# Patient Record
Sex: MALE | Race: WHITE
[De-identification: names, ages, dates, MRNs, and addresses within clinical notes are randomized per-mention and may not be internally consistent; named-entity substitution may affect disease eponyms.]

---

## 2018-06-16 ENCOUNTER — HOSPITAL ENCOUNTER (EMERGENCY)
Dept: HOSPITAL 17 - NEPE | Age: 33
Discharge: HOME | End: 2018-06-16
Payer: SELF-PAY

## 2018-06-16 VITALS
RESPIRATION RATE: 18 BRPM | OXYGEN SATURATION: 99 % | TEMPERATURE: 98 F | DIASTOLIC BLOOD PRESSURE: 82 MMHG | HEART RATE: 109 BPM | SYSTOLIC BLOOD PRESSURE: 146 MMHG

## 2018-06-16 VITALS — HEIGHT: 67 IN | BODY MASS INDEX: 22.15 KG/M2 | WEIGHT: 141.1 LBS

## 2018-06-16 DIAGNOSIS — R00.2: ICD-10-CM

## 2018-06-16 DIAGNOSIS — I49.9: ICD-10-CM

## 2018-06-16 DIAGNOSIS — R07.89: Primary | ICD-10-CM

## 2018-06-16 DIAGNOSIS — F14.90: ICD-10-CM

## 2018-06-16 DIAGNOSIS — Z72.0: ICD-10-CM

## 2018-06-16 LAB
ALBUMIN SERPL-MCNC: 4.8 GM/DL (ref 3.4–5)
ALP SERPL-CCNC: 89 U/L (ref 45–117)
ALT SERPL-CCNC: 25 U/L (ref 12–78)
AST SERPL-CCNC: 18 U/L (ref 15–37)
BASOPHILS # BLD AUTO: 0.1 TH/MM3 (ref 0–0.2)
BASOPHILS NFR BLD: 0.7 % (ref 0–2)
BILIRUB SERPL-MCNC: 0.8 MG/DL (ref 0.2–1)
BUN SERPL-MCNC: 11 MG/DL (ref 7–18)
CALCIUM SERPL-MCNC: 9.7 MG/DL (ref 8.5–10.1)
CHLORIDE SERPL-SCNC: 106 MEQ/L (ref 98–107)
CREAT SERPL-MCNC: 1.23 MG/DL (ref 0.6–1.3)
EOSINOPHIL # BLD: 0 TH/MM3 (ref 0–0.4)
EOSINOPHIL NFR BLD: 0.1 % (ref 0–4)
ERYTHROCYTE [DISTWIDTH] IN BLOOD BY AUTOMATED COUNT: 12.5 % (ref 11.6–17.2)
GFR SERPLBLD BASED ON 1.73 SQ M-ARVRAT: 68 ML/MIN (ref 89–?)
GLUCOSE SERPL-MCNC: 105 MG/DL (ref 74–106)
HCO3 BLD-SCNC: 19.4 MEQ/L (ref 21–32)
HCT VFR BLD CALC: 43.5 % (ref 39–51)
HGB BLD-MCNC: 15.2 GM/DL (ref 13–17)
INR PPP: 1.1 RATIO
LYMPHOCYTES # BLD AUTO: 2.9 TH/MM3 (ref 1–4.8)
LYMPHOCYTES NFR BLD AUTO: 22 % (ref 9–44)
MAGNESIUM SERPL-MCNC: 2 MG/DL (ref 1.5–2.5)
MCH RBC QN AUTO: 30.9 PG (ref 27–34)
MCHC RBC AUTO-ENTMCNC: 34.9 % (ref 32–36)
MCV RBC AUTO: 88.6 FL (ref 80–100)
MONOCYTE #: 1.3 TH/MM3 (ref 0–0.9)
MONOCYTES NFR BLD: 9.4 % (ref 0–8)
NEUTROPHILS # BLD AUTO: 9.1 TH/MM3 (ref 1.8–7.7)
NEUTROPHILS NFR BLD AUTO: 67.8 % (ref 16–70)
PLATELET # BLD: 299 TH/MM3 (ref 150–450)
PMV BLD AUTO: 8.8 FL (ref 7–11)
PROT SERPL-MCNC: 8.6 GM/DL (ref 6.4–8.2)
PROTHROMBIN TIME: 10.8 SEC (ref 9.8–11.6)
RBC # BLD AUTO: 4.91 MIL/MM3 (ref 4.5–5.9)
SODIUM SERPL-SCNC: 140 MEQ/L (ref 136–145)
TROPONIN I SERPL-MCNC: (no result) NG/ML (ref 0.02–0.05)
WBC # BLD AUTO: 13.4 TH/MM3 (ref 4–11)

## 2018-06-16 PROCEDURE — 71045 X-RAY EXAM CHEST 1 VIEW: CPT

## 2018-06-16 PROCEDURE — 83735 ASSAY OF MAGNESIUM: CPT

## 2018-06-16 PROCEDURE — 85610 PROTHROMBIN TIME: CPT

## 2018-06-16 PROCEDURE — 93005 ELECTROCARDIOGRAM TRACING: CPT

## 2018-06-16 PROCEDURE — 82552 ASSAY OF CPK IN BLOOD: CPT

## 2018-06-16 PROCEDURE — 85730 THROMBOPLASTIN TIME PARTIAL: CPT

## 2018-06-16 PROCEDURE — 82550 ASSAY OF CK (CPK): CPT

## 2018-06-16 PROCEDURE — 85025 COMPLETE CBC W/AUTO DIFF WBC: CPT

## 2018-06-16 PROCEDURE — 99284 EMERGENCY DEPT VISIT MOD MDM: CPT

## 2018-06-16 PROCEDURE — 80053 COMPREHEN METABOLIC PANEL: CPT

## 2018-06-16 PROCEDURE — 84484 ASSAY OF TROPONIN QUANT: CPT

## 2018-06-16 NOTE — PD
HPI


Chief Complaint:  Chest Pain


Time Seen by Provider:  03:15


Travel History


International Travel<30 days:  No


Contact w/Intl Traveler<30days:  No


Traveled to known affect area:  No





History of Present Illness


HPI


31-year-old male presents emergency department with chest discomfort the left 

side of his chest as well as palpitations.  Patient states that he did use 

cocaine several hours prior to arrival.  No shortness of breath no abdominal 

pain no nausea vomiting diarrhea constipation head injury neck injury back 

injury.  He is accompanied by his mother.  No early history of heart disease, 

the patient is a smoker.  No history of high blood pressure high cholesterol.  

States symptoms have resolved now.  Left side of the chest, no radiation, 

associated signs symptoms in context as above.





PFSH


Past Medical History


Medical History:  Denies Significant Hx


Diminished Hearing:  No





Past Surgical History


Surgical History:  No Previous Surgery





Social History


Alcohol Use:  Yes (FREQUENT )


Tobacco Use:  Yes (EVERYDAY )


Substance Use:  Yes (COCAINE, LAST USED 6/15/18)





Allergies-Medications


(Allergen,Severity, Reaction):  


Coded Allergies:  


     No Known Allergies (Unverified , 6/16/18)


Reported Meds & Prescriptions





Reported Meds & Active Scripts


Active


No Active Prescriptions or Reported Medications    








Review of Systems


Except as stated in HPI:  all other systems reviewed are Neg





Physical Exam


Narrative


GENERAL: Well-developed well-nourished pleasant male in no obvious distress peer


SKIN: Focused skin assessment warm/dry.


HEAD: Atraumatic. Normocephalic. 


EYES: Pupils equal and round. No scleral icterus. No injection or drainage. 


ENT: No nasal bleeding or discharge.  Mucous membranes pink and moist.


NECK: Trachea midline. No JVD. 


CARDIOVASCULAR: Regular rate and rhythm.  No murmur appreciated.  2+ bilateral 

pulses in all 4 extremity's, no murmurs gallops or rubs.


RESPIRATORY: No accessory muscle use. Clear to auscultation. Breath sounds 

equal bilaterally. 


GASTROINTESTINAL: Abdomen soft, non-tender, nondistended. Hepatic and splenic 

margins not palpable. 


MUSCULOSKELETAL: No obvious deformities. No clubbing.  No cyanosis.  No edema. 


NEUROLOGICAL: Awake and alert. No obvious cranial nerve deficits.  Motor 

grossly within normal limits. Normal speech.


PSYCHIATRIC: Appropriate mood and affect; insight and judgment normal.





Data


Data


Last Documented VS





Vital Signs








  Date Time  Temp Pulse Resp B/P (MAP) Pulse Ox O2 Delivery O2 Flow Rate FiO2


 


6/16/18 04:35        


 


6/16/18 03:01 98.0 109 18  99   








Orders





 Orders


Ckmb (Isoenzyme) Profile (6/16/18 03:38)


Complete Blood Count With Diff (6/16/18 03:38)


Comprehensive Metabolic Panel (6/16/18 03:38)


Magnesium (Mg) (6/16/18 03:38)


Prothrombin Time / Inr (Pt) (6/16/18 03:38)


Act Partial Throm Time (Ptt) (6/16/18 03:38)


Troponin I (6/16/18 03:38)


Chest, Single Ap (6/16/18 03:38)


Ecg Monitoring (6/16/18 03:38)


Bilateral Bp Monitoring (6/16/18 03:38)


Iv Access Insert/Monitor (6/16/18 03:38)


Oximetry (6/16/18 03:38)


Oxygen Administration (6/16/18 03:38)


Sodium Chloride 0.9% Flush (Ns Flush) (6/16/18 03:45)


CKMB (6/16/18 03:42)


CKMB% (6/16/18 03:42)


Ed Discharge Order (6/16/18 04:32)


Electrocardiogram (6/16/18 02:10)





Labs





Laboratory Tests








Test


  6/16/18


03:42


 


White Blood Count 13.4 TH/MM3 


 


Red Blood Count 4.91 MIL/MM3 


 


Hemoglobin 15.2 GM/DL 


 


Hematocrit 43.5 % 


 


Mean Corpuscular Volume 88.6 FL 


 


Mean Corpuscular Hemoglobin 30.9 PG 


 


Mean Corpuscular Hemoglobin


Concent 34.9 % 


 


 


Red Cell Distribution Width 12.5 % 


 


Platelet Count 299 TH/MM3 


 


Mean Platelet Volume 8.8 FL 


 


Neutrophils (%) (Auto) 67.8 % 


 


Lymphocytes (%) (Auto) 22.0 % 


 


Monocytes (%) (Auto) 9.4 % 


 


Eosinophils (%) (Auto) 0.1 % 


 


Basophils (%) (Auto) 0.7 % 


 


Neutrophils # (Auto) 9.1 TH/MM3 


 


Lymphocytes # (Auto) 2.9 TH/MM3 


 


Monocytes # (Auto) 1.3 TH/MM3 


 


Eosinophils # (Auto) 0.0 TH/MM3 


 


Basophils # (Auto) 0.1 TH/MM3 


 


CBC Comment DIFF FINAL 


 


Differential Comment  


 


Prothrombin Time 10.8 SEC 


 


Prothromb Time International


Ratio 1.1 RATIO 


 


 


Activated Partial


Thromboplast Time 26.8 SEC 


 


 


Blood Urea Nitrogen 11 MG/DL 


 


Creatinine 1.23 MG/DL 


 


Random Glucose 105 MG/DL 


 


Total Protein 8.6 GM/DL 


 


Albumin 4.8 GM/DL 


 


Calcium Level 9.7 MG/DL 


 


Magnesium Level 2.0 MG/DL 


 


Alkaline Phosphatase 89 U/L 


 


Aspartate Amino Transf


(AST/SGOT) 18 U/L 


 


 


Alanine Aminotransferase


(ALT/SGPT) 25 U/L 


 


 


Total Bilirubin 0.8 MG/DL 


 


Sodium Level 140 MEQ/L 


 


Potassium Level 3.5 MEQ/L 


 


Chloride Level 106 MEQ/L 


 


Carbon Dioxide Level 19.4 MEQ/L 


 


Anion Gap 15 MEQ/L 


 


Estimat Glomerular Filtration


Rate 68 ML/MIN 


 


 


Total Creatine Kinase 137 U/L 


 


Creatine Kinase MB 0.6 NG/ML 


 


Troponin I


  LESS THAN 0.02


NG/ML











MDM


Medical Decision Making


Medical Screen Exam Complete:  Yes


Emergency Medical Condition:  Yes


Differential Diagnosis


ACS unlikely, MI unlikely, medication reaction, cocaine abuse, cigarette smoker

, GERD, reflux, pneumonia.


Narrative Course


Patient room to the emergency department, he appears well in obvious distress, 

EKG nonischemic, troponin negative, chest x-ray negative.  Discussed with him 

smoking and cocaine cessation as both of these can lead to heart attack 

strokes.  As well as cancer risk.  He verbalized understanding and agreement.  

Discussed need follow-up with a primary care physician his daily health clinic 

for checkup.  Discussed returning to criteria at this time is stable for 

discharge.





Diagnosis





 Primary Impression:  


 Chest pain with low risk for cardiac etiology


Referrals:  


[facility]


Patient Instructions:  Chest Pain (DC), Cocaine Abuse (DC), General Instructions

, How to Stop Smoking (DC)





***Additional Instructions:  


Follow-up with the [facility] or your regular physician.


Scripts


No Active Prescriptions or Reported Meds


Disposition:  01 DISCHARGE HOME


Condition:  Stable











Alan Mena MD Jun 16, 2018 03:54

## 2018-06-16 NOTE — EKG
Date Performed: 06/16/2018       Time Performed: 02:10:08

 

PTAGE:      32 years

 

EKG:      Sinus rhythm 

 

 WITH SINUS ARRHYTHMIA NORMAL ECG 

 

NO PREVIOUS TRACING            

 

DOCTOR:   Gaetano Sales  Interpretating Date/Time  06/16/2018 12:37:52

## 2018-06-16 NOTE — RADRPT
EXAM DATE:  6/16/2018 4:01 AM EDT

AGE/SEX:        32 years / Male



INDICATIONS:  Left sided chest pain for 6 hours



CLINICAL DATA:  This is the patient's initial encounter. Patient reports that signs and symptoms have
 been present for 1 day and indicates a pain score of 5/10. 

                                                                          

MEDICAL/SURGICAL HISTORY:       None. None.



COMPARISON:      No prior exams available for comparison. 





FINDINGS:  

A single AP view of the chest demonstrates the lungs to be symmetrically aerated without evidence of 
mass, infiltrate or effusion.  The cardiomediastinal contours are unremarkable.  Osseous structures a
re intact.  





CONCLUSION: 

No acute cardiopulmonary disease



Electronically signed by: Benjamin James MD  6/16/2018 5:05 AM EDT